# Patient Record
Sex: MALE | Race: WHITE | NOT HISPANIC OR LATINO | Employment: OTHER | URBAN - METROPOLITAN AREA
[De-identification: names, ages, dates, MRNs, and addresses within clinical notes are randomized per-mention and may not be internally consistent; named-entity substitution may affect disease eponyms.]

---

## 2017-03-26 ENCOUNTER — HOSPITAL ENCOUNTER (EMERGENCY)
Facility: HOSPITAL | Age: 52
Discharge: HOME | End: 2017-03-26
Attending: EMERGENCY MEDICINE
Payer: OTHER GOVERNMENT

## 2017-03-26 VITALS
HEART RATE: 60 BPM | OXYGEN SATURATION: 95 % | TEMPERATURE: 98 F | WEIGHT: 220 LBS | RESPIRATION RATE: 20 BRPM | HEIGHT: 71 IN | SYSTOLIC BLOOD PRESSURE: 128 MMHG | DIASTOLIC BLOOD PRESSURE: 80 MMHG | BODY MASS INDEX: 30.8 KG/M2

## 2017-03-26 DIAGNOSIS — N20.0 RENAL CALCULUS OR STONE: Primary | ICD-10-CM

## 2017-03-26 DIAGNOSIS — N28.9 RENAL INSUFFICIENCY: ICD-10-CM

## 2017-03-26 LAB
ALBUMIN SERPL BCP-MCNC: 4.3 G/DL
ALP SERPL-CCNC: 99 U/L
ALT SERPL W/O P-5'-P-CCNC: 40 U/L
ANION GAP SERPL CALC-SCNC: 11 MMOL/L
AST SERPL-CCNC: 30 U/L
BACTERIA #/AREA URNS HPF: ABNORMAL /HPF
BASOPHILS # BLD AUTO: 0.03 K/UL
BASOPHILS NFR BLD: 0.2 %
BILIRUB SERPL-MCNC: 1.2 MG/DL
BILIRUB UR QL STRIP: NEGATIVE
BUN SERPL-MCNC: 25 MG/DL
CALCIUM SERPL-MCNC: 10.1 MG/DL
CHLORIDE SERPL-SCNC: 100 MMOL/L
CLARITY UR: CLEAR
CO2 SERPL-SCNC: 29 MMOL/L
COLOR UR: YELLOW
CREAT SERPL-MCNC: 2 MG/DL
DIFFERENTIAL METHOD: ABNORMAL
EOSINOPHIL # BLD AUTO: 0.2 K/UL
EOSINOPHIL NFR BLD: 1.7 %
ERYTHROCYTE [DISTWIDTH] IN BLOOD BY AUTOMATED COUNT: 12.6 %
EST. GFR  (AFRICAN AMERICAN): 43 ML/MIN/1.73 M^2
EST. GFR  (NON AFRICAN AMERICAN): 37 ML/MIN/1.73 M^2
GLUCOSE SERPL-MCNC: 112 MG/DL
GLUCOSE UR QL STRIP: NEGATIVE
HCT VFR BLD AUTO: 48.4 %
HGB BLD-MCNC: 17.6 G/DL
HGB UR QL STRIP: ABNORMAL
KETONES UR QL STRIP: NEGATIVE
LEUKOCYTE ESTERASE UR QL STRIP: NEGATIVE
LIPASE SERPL-CCNC: 67 U/L
LYMPHOCYTES # BLD AUTO: 2.5 K/UL
LYMPHOCYTES NFR BLD: 17.8 %
MCH RBC QN AUTO: 30 PG
MCHC RBC AUTO-ENTMCNC: 36.4 %
MCV RBC AUTO: 83 FL
MICROSCOPIC COMMENT: ABNORMAL
MONOCYTES # BLD AUTO: 1.4 K/UL
MONOCYTES NFR BLD: 10 %
NEUTROPHILS # BLD AUTO: 9.7 K/UL
NEUTROPHILS NFR BLD: 70 %
NITRITE UR QL STRIP: NEGATIVE
PH UR STRIP: 6 [PH] (ref 5–8)
PLATELET # BLD AUTO: 229 K/UL
PMV BLD AUTO: 10.3 FL
POTASSIUM SERPL-SCNC: 3.7 MMOL/L
PROT SERPL-MCNC: 6.8 G/DL
PROT UR QL STRIP: NEGATIVE
RBC # BLD AUTO: 5.86 M/UL
RBC #/AREA URNS HPF: 7 /HPF (ref 0–4)
SODIUM SERPL-SCNC: 140 MMOL/L
SP GR UR STRIP: 1.02 (ref 1–1.03)
SQUAMOUS #/AREA URNS HPF: 2 /HPF
URN SPEC COLLECT METH UR: ABNORMAL
UROBILINOGEN UR STRIP-ACNC: NEGATIVE EU/DL
WBC # BLD AUTO: 13.9 K/UL
WBC #/AREA URNS HPF: 1 /HPF (ref 0–5)

## 2017-03-26 PROCEDURE — 99284 EMERGENCY DEPT VISIT MOD MDM: CPT | Mod: 25

## 2017-03-26 PROCEDURE — 96375 TX/PRO/DX INJ NEW DRUG ADDON: CPT

## 2017-03-26 PROCEDURE — 80053 COMPREHEN METABOLIC PANEL: CPT

## 2017-03-26 PROCEDURE — 25000003 PHARM REV CODE 250: Performed by: NURSE PRACTITIONER

## 2017-03-26 PROCEDURE — 83690 ASSAY OF LIPASE: CPT

## 2017-03-26 PROCEDURE — 63600175 PHARM REV CODE 636 W HCPCS: Performed by: NURSE PRACTITIONER

## 2017-03-26 PROCEDURE — 96374 THER/PROPH/DIAG INJ IV PUSH: CPT

## 2017-03-26 PROCEDURE — 81000 URINALYSIS NONAUTO W/SCOPE: CPT

## 2017-03-26 PROCEDURE — 96361 HYDRATE IV INFUSION ADD-ON: CPT

## 2017-03-26 PROCEDURE — 85025 COMPLETE CBC W/AUTO DIFF WBC: CPT

## 2017-03-26 PROCEDURE — 82370 X-RAY ASSAY CALCULUS: CPT

## 2017-03-26 RX ORDER — ONDANSETRON 2 MG/ML
4 INJECTION INTRAMUSCULAR; INTRAVENOUS
Status: COMPLETED | OUTPATIENT
Start: 2017-03-26 | End: 2017-03-26

## 2017-03-26 RX ORDER — HYDROMORPHONE HYDROCHLORIDE 2 MG/ML
1 INJECTION, SOLUTION INTRAMUSCULAR; INTRAVENOUS; SUBCUTANEOUS
Status: COMPLETED | OUTPATIENT
Start: 2017-03-26 | End: 2017-03-26

## 2017-03-26 RX ORDER — HYDROCODONE BITARTRATE AND ACETAMINOPHEN 5; 325 MG/1; MG/1
1 TABLET ORAL EVERY 6 HOURS PRN
Qty: 10 TABLET | Refills: 0 | Status: SHIPPED | OUTPATIENT
Start: 2017-03-26

## 2017-03-26 RX ORDER — HYDROMORPHONE HYDROCHLORIDE 2 MG/ML
0.5 INJECTION, SOLUTION INTRAMUSCULAR; INTRAVENOUS; SUBCUTANEOUS
Status: DISCONTINUED | OUTPATIENT
Start: 2017-03-26 | End: 2017-03-26

## 2017-03-26 RX ADMIN — ONDANSETRON 4 MG: 2 INJECTION INTRAMUSCULAR; INTRAVENOUS at 03:03

## 2017-03-26 RX ADMIN — HYDROMORPHONE HYDROCHLORIDE 1 MG: 2 INJECTION INTRAMUSCULAR; INTRAVENOUS; SUBCUTANEOUS at 03:03

## 2017-03-26 RX ADMIN — SODIUM CHLORIDE 1000 ML: 0.9 INJECTION, SOLUTION INTRAVENOUS at 03:03

## 2017-03-26 NOTE — ED AVS SNAPSHOT
[facility]  [address] [place] [address]               Cristian Strange   3/26/2017  2:58 PM   ED    Description:  Male : 1965   Department:  [facility]           Your Care was Coordinated By:     Provider Role From To    Alfa Sharma MD Attending Provider 17 1500 --    Chelle Calix NP Nurse Practitioner 17 1500 --      Reason for Visit     Flank Pain           Diagnoses this Visit        Comments    Renal calculus or stone    -  Primary     Renal insufficiency           ED Disposition     None           To Do List           Follow-up Information     Schedule an appointment as soon as possible for a visit with Brian Griggs MD.    Specialty:  Urology    Why:  Urology    Contact information:    [address] [facility]  [place] [address]  [phone]         These Medications        Disp Refills Start End    hydrocodone-acetaminophen 5-325mg (NORCO) 5-325 mg per tablet 10 tablet 0 3/26/2017     Take 1 tablet by mouth every 6 (six) hours as needed for Pain. Please do not drink alcohol, drive, operate heavy machinery, work or swim while taking this medication as it can cause mental impairment. - Oral      [facility] On Call Nurse Care Line -  Assistance  Registered nurses in the [facility] On Call Center provide clinical advisement, health education, appointment booking, and other advisory services.  Call for this free service at [phone].             Medications           Message regarding Medications     Verify the changes and/or additions to your medication regime listed below are the same as discussed with your clinician today.  If any of these changes or additions are incorrect, please notify your healthcare provider.        START taking these NEW medications        Refills    hydrocodone-acetaminophen 5-325mg (NORCO) 5-325 mg per tablet 0    Sig: Take 1 tablet by mouth every 6  "(six) hours as needed for Pain. Please do not drink alcohol, drive, operate heavy machinery, work or swim while taking this medication as it can cause mental impairment.    Class: Print    Route: Oral      These medications were administered today        Dose Freq    sodium chloride 0.9% bolus 1,000 mL 1,000 mL ED 1 Time    Sig: Inject 1,000 mLs into the vein ED 1 Time.    Class: Normal    Route: Intravenous    hydromorphone (PF) injection 1 mg 1 mg ED 1 Time    Sig: Inject 0.5 mLs (1 mg total) into the vein ED 1 Time.    Class: Normal    Route: Intravenous    Cosign for Ordering: Required by Alfa Sharma MD    ondansetron injection 4 mg 4 mg ED 1 Time    Sig: Inject 4 mg into the vein ED 1 Time.    Class: Normal    Route: Intravenous      STOP taking these medications     azithromycin (ZITHROMAX) 1 gram Pack Take 1 g by mouth once.    pseudoephedrine (SUDAFED) 30 MG tablet Take 30 mg by mouth every 4 (four) hours as needed for Congestion.           Verify that the below list of medications is an accurate representation of the medications you are currently taking.  If none reported, the list may be blank. If incorrect, please contact your healthcare provider. Carry this list with you in case of emergency.           Current Medications     hydrochlorothiazide (HYDRODIURIL) 25 MG tablet Take 25 mg by mouth once daily.    hydrocodone-acetaminophen 5-325mg (NORCO) 5-325 mg per tablet Take 1 tablet by mouth every 6 (six) hours as needed for Pain. Please do not drink alcohol, drive, operate heavy machinery, work or swim while taking this medication as it can cause mental impairment.           Clinical Reference Information           Your Vitals Were     BP Pulse Temp Resp Height Weight    161/93 (BP Location: Right arm, Patient Position: Sitting) 66 98 °F (36.7 °C) (Oral) 20 5' 11" (1.803 m) 99.8 kg (220 lb)    SpO2 BMI             98% 30.68 kg/m2         Allergies as of 3/26/2017        Reactions    Sulfa " (Sulfonamide Antibiotics) Anaphylaxis      Immunizations Administered on Date of Encounter - 3/26/2017     None      ED Micro, Lab, POCT     Start Ordered       Status Ordering Provider    03/26/17 1607 03/26/17 1606  Urinary Stone Analysis  Once      In process     03/26/17 1504 03/26/17 1503  Lipase  STAT      Final result     03/26/17 1504 03/26/17 1503  Urinalysis  STAT      Final result     03/26/17 1503 03/26/17 1503  CBC auto differential  STAT      Final result     03/26/17 1503 03/26/17 1503  Comprehensive metabolic panel  STAT      Final result     03/26/17 1503 03/26/17 1503  Urinalysis Microscopic  Once      Final result       ED Imaging Orders     Start Ordered       Status Ordering Provider    03/26/17 1514 03/26/17 1513  CT Abdomen Pelvis  Without Contrast  1 time imaging      Final result     03/26/17 1510 03/26/17 1510    1 time imaging,   Status:  Canceled      Canceled         Discharge Instructions       Please return to the ED for any new or worsening symptoms: chest pain, shortness of breath, loss of consciousness or any other concerns. Please follow up with Urology within in the week. You may also call [phone] for the [facility] same day appointment line.    Discharge References/Attachments     KIDNEY STONE, PASSED (ENGLISH)      [facility] Sign-Up     Activating your [facility] account is as easy as 1-2-3!     1) Visit [email or web address], select Sign Up Now, enter this activation code and your date of birth, then select Next.  [email or web address]  Expires: 5/10/2017  4:46 PM      2) Create a username and password to use when you visit [facility] in the future and select a security question in case you lose your password and select Next.    3) Enter your e-mail address and click Sign Up!    Additional Information  If you have questions, please e-mail [email or web address] or call [phone] to talk to our [facility] staff. Remember, [facility] is NOT to be used for urgent needs. For medical  emergencies, dial 911.          [facility] complies with applicable Federal civil rights laws and does not discriminate on the basis of race, color, national origin, age, disability, or sex.        Language Assistance Services     ATTENTION: Language assistance services are available, free of charge. Please call [phone].      ATENCIÓN: Si habla español, tiene a albarado disposición servicios gratuitos de asistencia lingüística. Llame al [phone].     CHÚ Ý: N?u b?n nói Ti?ng Vi?t, có các d?ch v? h? tr? ngôn ng? mi?n phí dành cho b?n. G?i s? [phone].

## 2017-03-26 NOTE — DISCHARGE INSTRUCTIONS
Please return to the ED for any new or worsening symptoms: chest pain, shortness of breath, loss of consciousness or any other concerns. Please follow up with Urology within in the week. You may also call [phone] for the [facility] same day appointment line.

## 2017-03-26 NOTE — ED PROVIDER NOTES
Encounter Date: 3/26/2017       History     Chief Complaint   Patient presents with    Flank Pain     R flank pain that started last PM.      Review of patient's allergies indicates:   Allergen Reactions    Sulfa (sulfonamide antibiotics) Anaphylaxis     HPI Comments: Chief Complaint: Right flank pain    HPI: This is a 51-year-old male with hypertension who presents to the ED with complaints of acute, severe right flank and right lower quadrant abdominal pain that began last night.  He reports the pain was initially dull and intermittent however, it has worsened.  It is now sharp, throbbing and constant.  He reports one episode of vomiting.  He denies diarrhea, constipation, dysuria.  He denies history of kidney stones.  No history of abdominal surgery.  No attempted treatment.    The history is provided by the patient.     Past Medical History:   Diagnosis Date    Hypertension      Past Surgical History:   Procedure Laterality Date    CHOLECYSTECTOMY      HERNIA REPAIR       History reviewed. No pertinent family history.  Social History   Substance Use Topics    Smoking status: Never Smoker    Smokeless tobacco: None    Alcohol use No     Review of Systems   Constitutional: Negative for chills and fever.   Respiratory: Negative for cough and shortness of breath.    Gastrointestinal: Positive for abdominal pain, nausea and vomiting. Negative for blood in stool, constipation and diarrhea.   Genitourinary: Positive for flank pain. Negative for dysuria, hematuria and testicular pain.   Musculoskeletal: Positive for back pain.   Neurological: Negative for seizures, syncope and headaches.       Physical Exam   Initial Vitals   BP Pulse Resp Temp SpO2   03/26/17 1455 03/26/17 1455 03/26/17 1455 03/26/17 1455 03/26/17 1455   161/93 66 20 98 °F (36.7 °C) 98 %     Physical Exam    Constitutional: He appears well-developed and well-nourished.  Non-toxic appearance. He appears distressed.   Eyes: EOM are normal.    Neck: Full passive range of motion without pain. Neck supple. No rigidity.   Cardiovascular: Normal rate, S1 normal, S2 normal and normal heart sounds. Exam reveals no gallop.    No murmur heard.  Pulmonary/Chest: Effort normal and breath sounds normal. No tachypnea. He has no decreased breath sounds. He has no wheezes. He has no rhonchi. He has no rales.   Abdominal: Soft. Normal appearance. There is tenderness in the right lower quadrant. There is no CVA tenderness, no tenderness at McBurney's point and negative An's sign.   Neurological: He is alert and oriented to person, place, and time. GCS eye subscore is 4. GCS verbal subscore is 5. GCS motor subscore is 6.   Skin: Skin is warm, dry and intact. No rash noted.   Psychiatric: He has a normal mood and affect.         ED Course   Procedures  Labs Reviewed   CBC W/ AUTO DIFFERENTIAL - Abnormal; Notable for the following:        Result Value    WBC 13.90 (*)     MCHC 36.4 (*)     Gran # 9.7 (*)     Mono # 1.4 (*)     Lymph% 17.8 (*)     All other components within normal limits   COMPREHENSIVE METABOLIC PANEL - Abnormal; Notable for the following:     Glucose 112 (*)     BUN, Bld 25 (*)     Creatinine 2.0 (*)     Total Bilirubin 1.2 (*)     eGFR if  43 (*)     eGFR if non  37 (*)     All other components within normal limits   LIPASE - Abnormal; Notable for the following:     Lipase 67 (*)     All other components within normal limits   URINALYSIS - Abnormal; Notable for the following:     Occult Blood UA 2+ (*)     All other components within normal limits   URINALYSIS MICROSCOPIC - Abnormal; Notable for the following:     RBC, UA 7 (*)     All other components within normal limits   URINARY STONE ANALYSIS             Medical Decision Making:   ED Management:  This is a 51-year-old male who presents to the ED with complaints of right-sided flank and lower abdominal pain.  He is afebrile and nontoxic-appearing.  He does appear  to be uncomfortable and cannot get in a comfortable position.  On exam, abdomen is soft with right lower quadrant tenderness.  Urinalysis positive for hematuria.  CBC reveals a mild leukocytosis, WBC 13.  CMP reveals an elevated creatinine, 2.0.  CT of abdomen/pelvis without contrast shows a 3 mm stone in the urinary bladder.  There is mild right-sided hydronephrosis.  No evidence of appendicitis.  After CT, patient urinated and passed a stone.  This was sent to the lab.  No evidence of UTI or pyelonephritis.  I believe patient has passed this stone.  Discussed results with him and encourage fluids.  No indication for emergent urology consult.  No evidence of infection or appendicitis.  Discharged home with prescription for Norco and instructions for supportive care and follow-up.  Return precautions given.  Patient's case is discussed with Dr. Sharma, who agrees with plan of care.              Attending Attestation:     Physician Attestation Statement for NP/PA:   I discussed this assessment and plan of this patient with the NP/PA, but I did not personally examine the patient. The face to face encounter was performed by the NP/PA.                  ED Course     Clinical Impression:   The primary encounter diagnosis was Renal calculus or stone. A diagnosis of Renal insufficiency was also pertinent to this visit.    Disposition:   Disposition: Discharged  Condition: Stable       Chelle Calix NP  03/26/17 1839       Alfa Sharma MD  03/28/17 0051

## 2017-03-27 ENCOUNTER — TELEPHONE (OUTPATIENT)
Dept: UROLOGY | Facility: CLINIC | Age: 52
End: 2017-03-27

## 2017-03-27 NOTE — TELEPHONE ENCOUNTER
----- Message from Elizabeth Erazo sent at 3/27/2017 10:35 AM CDT -----  x_  1st Request  _  2nd Request  _  3rd Request        Who: MARIAELENA SEPULVEDA [[id number]]    Why: Pt was seen in the ER and needs a f/u appointment. I attempted to schedule on but the soonest I had was 04/06 and he is requesting a sooner appointment. Please call to schedule.    What Number to Call Back:[phone]    When to Expect a call back: (Before the end of the day)   -- if the call is after 12:00, the call back will be tomorrow.

## 2017-04-04 ENCOUNTER — OFFICE VISIT (OUTPATIENT)
Dept: UROLOGY | Facility: CLINIC | Age: 52
End: 2017-04-04
Attending: UROLOGY
Payer: OTHER GOVERNMENT

## 2017-04-04 ENCOUNTER — HOSPITAL ENCOUNTER (OUTPATIENT)
Dept: RADIOLOGY | Facility: OTHER | Age: 52
Discharge: HOME | End: 2017-04-04
Attending: UROLOGY
Payer: OTHER GOVERNMENT

## 2017-04-04 VITALS
BODY MASS INDEX: 31.76 KG/M2 | HEIGHT: 71 IN | WEIGHT: 226.88 LBS | SYSTOLIC BLOOD PRESSURE: 122 MMHG | DIASTOLIC BLOOD PRESSURE: 77 MMHG | HEART RATE: 58 BPM

## 2017-04-04 DIAGNOSIS — N20.0 NEPHROLITHIASIS: ICD-10-CM

## 2017-04-04 DIAGNOSIS — N20.0 NEPHROLITHIASIS: Primary | ICD-10-CM

## 2017-04-04 PROCEDURE — 99244 OFF/OP CNSLTJ NEW/EST MOD 40: CPT | Mod: S$PBB,,, | Performed by: UROLOGY

## 2017-04-04 PROCEDURE — 74000 XR KUB: CPT | Mod: 26,,, | Performed by: RADIOLOGY

## 2017-04-04 PROCEDURE — 99999 PR PBB SHADOW E&M-EST. PATIENT-LVL III: CPT | Mod: PBBFAC,,, | Performed by: UROLOGY

## 2017-04-04 PROCEDURE — 99213 OFFICE O/P EST LOW 20 MIN: CPT | Mod: PBBFAC | Performed by: UROLOGY

## 2017-04-04 PROCEDURE — 74000 XR KUB: CPT | Mod: TC

## 2017-04-04 RX ORDER — LISINOPRIL 10 MG/1
10 TABLET ORAL DAILY
COMMUNITY
Start: 2017-03-28

## 2017-04-04 RX ORDER — LORATADINE 10 MG/1
10 TABLET ORAL DAILY PRN
COMMUNITY
Start: 2016-12-29

## 2017-04-04 NOTE — PROGRESS NOTES
"Subjective:      Cristian Strange is a 51 y.o. male who was referred by Alfa Sharma MD for evaluation of nephrolithiasis.      Urolithiasis  Patient complains of right flank pain with radiation to the abdomen. Onset of symptoms was abrupt starting 3 days ago with completely resolved course since that time. Patient describes the pain as sharp, intermittent and rated as severe. The patient has had no nausea/no vomiting and no diaphoresis. There has been no fever or chills. The patient is not complaining of dysuria or frequency. Risk factors for urolithiasis: none.    He was seen in ER at [facility] on Sunday. He passed stone while there and has felt better since.    The following portions of the patient's history were reviewed and updated as appropriate: allergies, current medications, past family history, past medical history, past social history, past surgical history and problem list.    Review of Systems  Constitutional: no fever or chills  ENT: no nasal congestion or sore throat  Respiratory: no cough or shortness of breath  Cardiovascular: no chest pain or palpitations  Gastrointestinal: no nausea or vomiting, tolerating diet  Genitourinary: as per HPI  Hematologic/Lymphatic: no easy bruising or lymphadenopathy  Musculoskeletal: no arthralgias or myalgias  Neurological: no seizures or tremors  Behavioral/Psych: no auditory or visual hallucinations     Objective:   Vitals: /77 (BP Location: Right arm, Patient Position: Sitting, BP Method: Automatic)  Pulse (!) 58  Ht 5' 11" (1.803 m)  Wt 102.9 kg (226 lb 13.7 oz)  BMI 31.64 kg/m2    Physical Exam   Physical Exam   Constitutional: He is oriented to person, place, and time. He appears well-developed and well-nourished. No distress.   HENT:   Head: Normocephalic and atraumatic.   Right Ear: External ear normal.   Left Ear: External ear normal.   Nose: Nose normal.   Mouth/Throat: Oropharynx is clear and moist.   Eyes: Conjunctivae and EOM are normal. " Right eye exhibits no discharge. Left eye exhibits no discharge. No scleral icterus.   Neck: Neck supple. No tracheal deviation present. No thyromegaly present.   Cardiovascular: Normal rate and regular rhythm.  PMI is not displaced.    Pulmonary/Chest: Effort normal. No respiratory distress. He exhibits no tenderness.   Abdominal: Soft. He exhibits no distension. There is no tenderness. There is no CVA tenderness. No hernia.   Musculoskeletal: He exhibits no edema.   Neurological: He is alert and oriented to person, place, and time.   Skin: Skin is warm and dry. No rash noted. No erythema.   Psychiatric: He has a normal mood and affect. His speech is normal and behavior is normal. Judgment and thought content normal. His mood appears not anxious. Cognition and memory are normal.      Lab Review   Urinalysis demonstrates negative for all components  Lab Results   Component Value Date    WBC 13.90 (H) 03/26/2017    HGB 17.6 03/26/2017    HCT 48.4 03/26/2017    MCV 83 03/26/2017     03/26/2017     Lab Results   Component Value Date    CREATININE 2.0 (H) 03/26/2017    BUN 25 (H) 03/26/2017       Imaging (all images personally reviewed; agree with report below)  Results for orders placed during the hospital encounter of 03/26/17   CT Abdomen Pelvis  Without Contrast    Narrative Contiguous 5 mm noncontrast axial images were obtained through the abdomen and pelvis with both coronal and sagittal reconstructions of the abdomen and pelvis obtained.    There are mild dependent atelectatic changes present within the lung bases.  There are degenerative changes within the lumbar spine.  There is no evidence for acute fracture or bone destruction.  There is a subcentimeter hypodensity within the dome of the right lobe of the liver likely representing a small hepatic cyst although too small to adequately characterize.  No other focal abnormalities of the liver are identified.  The patient is status post cholecystectomy.   The spleen, stomach, and pancreas all appear grossly unremarkable.  The adrenal glands are not enlarged.  There is a 3.4 cm left renal cyst present.  There is mild right-sided hydronephrosis present and there appears to be mild stranding of the perinephric and periureteral fat on the right.  There is a 3 mm calcification within the bladder lumen near the midline in a dependent location likely representing a recently passed ureteral calculus.  There is a 7 mm nonobstructing calculus within the lower pole of the left kidney.  The abdominal aorta tapers normally without aneurysmal dilatation.  No para-aortic lymphadenopathy is identified.  There is no CT evidence for appendicitis.  There is colonic diverticulosis present without CT evidence for diverticulitis.  The urinary bladder appears grossly unremarkable.  There is no evidence for pelvic or inguinal lymphadenopathy.    Impression 3 mm calcification within the dependent urinary bladder near the midline with findings suggestive of mild right-sided hydronephrosis with stranding of the perinephric and periureteral fat on the right.  A recently passed right ureteral calculus is suspected.  7 mm nonobstructing calculus within the lower pole of left kidney.  Left renal cyst.  Probable subcentimeter hepatic cyst.  Status post cholecystectomy.  Colonic diverticulosis without evidence for diverticulitis.      Electronically signed by: ADAMA ESTRADA MD  Date:     03/26/17  Time:    15:57           Assessment:     1. Nephrolithiasis      Passed right ureteral stone in ER. Still has left renal stone.    Plan:   1. Reviewed CT and discussed left renal stone. Due to size, offered treatment to prevent acute episode, which he wishes to proceed with.  2. Discussed URS vs ESWL for treatment of left renal stone. Recommend ESWL (pending KUB). Reviewed risks and benefits. Will schedule next week.  3. KUB today.  4. Left ESWL (pending KUB) next Thursday

## 2017-04-04 NOTE — LETTER
April 4, 2017      Alfa Sharma MD  [address] [place] [address]           [facility]  [address] [place] [address], [facility] [address]  [facility] [address]  Phone: [phone]          Patient: Cristian Strange   MR Number: [id number]   YOB: 1965   Date of Visit: 4/4/2017       Dear Dr. Alfa Sharma:    Thank you for referring Cristian Strange to me for evaluation. Attached you will find relevant portions of my assessment and plan of care.    If you have questions, please do not hesitate to call me. I look forward to following Cristian Strange along with you.    Sincerely,    Brian Griggs MD    Enclosure  CC:  No Recipients    If you would like to receive this communication electronically, please contact [email or web address] or [phone] to request more information on [employer] Link access.    For providers and/or their staff who would like to refer a patient to [facility], please contact us through our one-stop-shop provider referral line, [facility], at [phone].    If you feel you have received this communication in error or would no longer like to receive these types of communications, please e-mail [email or web address]

## 2017-04-05 ENCOUNTER — TELEPHONE (OUTPATIENT)
Dept: UROLOGY | Facility: CLINIC | Age: 52
End: 2017-04-05

## 2017-04-05 LAB — URINARY STONE ANALYSIS: NORMAL

## 2017-04-05 NOTE — TELEPHONE ENCOUNTER
----- Message from Major Chris sent at 4/5/2017  2:38 PM CDT -----  Contact: Patient  Patient called about his kidney stones. He said that his wife was contacted regarding this issue but there was some confusion that he needs clarification on. His work hours office number is [phone]

## 2017-04-11 ENCOUNTER — TELEPHONE (OUTPATIENT)
Dept: UROLOGY | Facility: CLINIC | Age: 52
End: 2017-04-11

## 2017-04-11 NOTE — TELEPHONE ENCOUNTER
----- Message from Shireen Wade sent at 4/11/2017  4:20 PM CDT -----  Contact: Self  X  1st Request  _  2nd Request  _  3rd Request        Who: MARIAELENA SEPULVEDA [[id number]]    Why: Pt is calling to cancel surgery and pre op and will call back to reschedule at a later date and time.      What Number to Call Back: [phone]    When to Expect a call back: (Before the end of the day)   -- if the call is after 12:00, the call back will be tomorrow.

## 2017-04-11 NOTE — TELEPHONE ENCOUNTER
Called cell number not able to leave a message, called home number left a detail message for patient to contact the office.spoke to patient wife and was given the cell number informed her that it states no voice mail set up she stated he was in a meeting.Spoke to patient and he states he has a lot going on with work at this time he will call because he really wants it done . He thinks May will be good for him.

## 2017-04-11 NOTE — TELEPHONE ENCOUNTER
----- Message from Shireen Wade sent at 4/11/2017  8:43 AM CDT -----  Contact: Self  X  1st Request  _  2nd Request  _  3rd Request        Who: MARIAELENA SEPULVEDA [[id number]]    Why:  Pt is calling to reschedule surgery for some time in the middle of May.  Please contact pt to further discuss.    What Number to Call Back:  [phone]    When to Expect a call back: (Before the end of the day)   -- if the call is after 12:00, the call back will be tomorrow.

## 2017-05-09 ENCOUNTER — TELEPHONE (OUTPATIENT)
Dept: UROLOGY | Facility: CLINIC | Age: 52
End: 2017-05-09

## 2017-05-09 NOTE — TELEPHONE ENCOUNTER
----- Message from Justine Loja MA sent at 5/9/2017  1:47 PM CDT -----  Contact: MARIAELENA SEPULVEDA [[id number]]  Please advise.  ----- Message -----     From: Estephanie Beavers     Sent: 5/9/2017   1:23 PM       To: Indu Torres Staff    _  1st Request  _  2nd Request  _  3rd Request        Who: MARIAELENA SEPULVEDA [[id number]]    Why: pt would like to schedule surgery, Please call, Thanks!    What Number to Call Back: [phone]    When to Expect a call back: (Before the end of the day)   -- if the call is after 12:00, the call back will be tomorrow.

## 2017-05-12 ENCOUNTER — TELEPHONE (OUTPATIENT)
Dept: UROLOGY | Facility: CLINIC | Age: 52
End: 2017-05-12

## 2017-05-12 NOTE — TELEPHONE ENCOUNTER
----- Message from Elizabeth Sandeeanthony sent at 5/12/2017  3:46 PM CDT -----  x_  1st Request  _  2nd Request  _  3rd Request        Who: MARIAELENA SEPULVEDA [[id number]]    Why: Pt called he would like to schedule his procedure that he cancelled in the month of April. Please call him.     What Number to Call Back: [phone]    When to Expect a call back: (Before the end of the day)   -- if the call is after 12:00, the call back will be tomorrow.

## 2017-05-19 DIAGNOSIS — N20.0 NEPHROLITHIASIS: Primary | ICD-10-CM

## 2017-05-19 NOTE — H&P
"Subjective:     The following note is copied from visit on April 4 2017 and edited as indicated.      Cristian Strange is a 51 y.o. male who was referred by Alfa Sharma MD for evaluation of nephrolithiasis.      Urolithiasis  Patient complains of right flank pain with radiation to the abdomen. Onset of symptoms was abrupt starting 3 days ago with completely resolved course since that time. Patient describes the pain as sharp, intermittent and rated as severe. The patient has had no nausea/no vomiting and no diaphoresis. There has been no fever or chills. The patient is not complaining of dysuria or frequency. Risk factors for urolithiasis: none.     He was seen in ER at [facility] on Sunday. He passed stone while there and has felt better since.     The following portions of the patient's history were reviewed and updated as appropriate: allergies, current medications, past family history, past medical history, past social history, past surgical history and problem list.     Review of Systems  Constitutional: no fever or chills  ENT: no nasal congestion or sore throat  Respiratory: no cough or shortness of breath  Cardiovascular: no chest pain or palpitations  Gastrointestinal: no nausea or vomiting, tolerating diet  Genitourinary: as per HPI  Hematologic/Lymphatic: no easy bruising or lymphadenopathy  Musculoskeletal: no arthralgias or myalgias  Neurological: no seizures or tremors  Behavioral/Psych: no auditory or visual hallucinations      Objective:   Vitals: /77 (BP Location: Right arm, Patient Position: Sitting, BP Method: Automatic)  Pulse (!) 58  Ht 5' 11" (1.803 m)  Wt 102.9 kg (226 lb 13.7 oz)  BMI 31.64 kg/m2     Physical Exam   Physical Exam   Constitutional: He is oriented to person, place, and time. He appears well-developed and well-nourished. No distress.   HENT:   Head: Normocephalic and atraumatic.   Right Ear: External ear normal.   Left Ear: External ear normal.   Nose: Nose " normal.   Mouth/Throat: Oropharynx is clear and moist.   Eyes: Conjunctivae and EOM are normal. Right eye exhibits no discharge. Left eye exhibits no discharge. No scleral icterus.   Neck: Neck supple. No tracheal deviation present. No thyromegaly present.   Cardiovascular: Normal rate and regular rhythm. PMI is not displaced.   Pulmonary/Chest: Effort normal. No respiratory distress. He exhibits no tenderness.   Abdominal: Soft. He exhibits no distension. There is no tenderness. There is no CVA tenderness. No hernia.   Musculoskeletal: He exhibits no edema.   Neurological: He is alert and oriented to person, place, and time.   Skin: Skin is warm and dry. No rash noted. No erythema.   Psychiatric: He has a normal mood and affect. His speech is normal and behavior is normal. Judgment and thought content normal. His mood appears not anxious. Cognition and memory are normal.      Lab Review   Urinalysis demonstrates negative for all components        Lab Results   Component Value Date     WBC 13.90 (H) 03/26/2017     HGB 17.6 03/26/2017     HCT 48.4 03/26/2017     MCV 83 03/26/2017      03/26/2017            Lab Results   Component Value Date     CREATININE 2.0 (H) 03/26/2017     BUN 25 (H) 03/26/2017         Imaging (all images personally reviewed; agree with report below)       Results for orders placed during the hospital encounter of 03/26/17   CT Abdomen Pelvis Without Contrast     Narrative Contiguous 5 mm noncontrast axial images were obtained through the abdomen and pelvis with both coronal and sagittal reconstructions of the abdomen and pelvis obtained.     There are mild dependent atelectatic changes present within the lung bases. There are degenerative changes within the lumbar spine. There is no evidence for acute fracture or bone destruction. There is a subcentimeter hypodensity within the dome of the right lobe of the liver likely representing a small hepatic cyst although too small to adequately  characterize. No other focal abnormalities of the liver are identified. The patient is status post cholecystectomy. The spleen, stomach, and pancreas all appear grossly unremarkable. The adrenal glands are not enlarged. There is a 3.4 cm left renal cyst present. There is mild right-sided hydronephrosis present and there appears to be mild stranding of the perinephric and periureteral fat on the right. There is a 3 mm calcification within the bladder lumen near the midline in a dependent location likely representing a recently passed ureteral calculus. There is a 7 mm nonobstructing calculus within the lower pole of the left kidney. The abdominal aorta tapers normally without aneurysmal dilatation. No para-aortic lymphadenopathy is identified. There is no CT evidence for appendicitis. There is colonic diverticulosis present without CT evidence for diverticulitis. The urinary bladder appears grossly unremarkable. There is no evidence for pelvic or inguinal lymphadenopathy.     Impression 3 mm calcification within the dependent urinary bladder near the midline with findings suggestive of mild right-sided hydronephrosis with stranding of the perinephric and periureteral fat on the right. A recently passed right ureteral calculus is suspected.  7 mm nonobstructing calculus within the lower pole of left kidney.  Left renal cyst.  Probable subcentimeter hepatic cyst.  Status post cholecystectomy.  Colonic diverticulosis without evidence for diverticulitis.        Electronically signed by: ADAMA ESTRADA MD  Date:     03/26/17  Time:    15:57              Assessment:      1. Nephrolithiasis       Passed right ureteral stone in ER. Still has left renal stone.     Plan:   1. Reviewed CT and discussed left renal stone. Due to size, offered treatment to prevent acute episode, which he wishes to proceed with.  2. Discussed URS vs ESWL for treatment of left renal stone. Recommend ESWL (pending KUB). Reviewed risks and benefits.  Will schedule next week.  3. KUB today.  4. Left ESWL (pending KUB) next Thursday